# Patient Record
Sex: FEMALE | Race: WHITE | NOT HISPANIC OR LATINO
[De-identification: names, ages, dates, MRNs, and addresses within clinical notes are randomized per-mention and may not be internally consistent; named-entity substitution may affect disease eponyms.]

---

## 2022-01-12 ENCOUNTER — RX ONLY (OUTPATIENT)
Age: 52
Setting detail: RX ONLY
End: 2022-01-12

## 2023-01-12 ENCOUNTER — APPOINTMENT (RX ONLY)
Dept: URBAN - METROPOLITAN AREA CLINIC 146 | Facility: CLINIC | Age: 53
Setting detail: DERMATOLOGY
End: 2023-01-12

## 2023-01-12 DIAGNOSIS — Z41.9 ENCOUNTER FOR PROCEDURE FOR PURPOSES OTHER THAN REMEDYING HEALTH STATE, UNSPECIFIED: ICD-10-CM

## 2023-01-12 PROCEDURE — ? FILLERS

## 2023-01-12 NOTE — PROCEDURE: FILLERS
Marionette Lines Filler  Volume In Cc: 0
Use Map Statement For Sites (Optional): No
Post-Care Instructions: Patient instructed to apply ice to reduce swelling.
Map Statment: See [address] for Complete Details
Number Of Syringes (Required For Inventory): 1
Additional Anesthesia Volume In Cc: 6
Topical Anesthesia?: BLT cream (benzocaine 30%, lidocaine 12%, tetracaine 6%)
Inventory Information: This plan will send filler information to inventory based on the fillers you select. Multiple fillers can be sent but you must ensure you select the appropriate fillers in the inventory tab.
Detail Level: Simple
Show Inventory Tab: Show
Filler: Juvederm Ultra XC
Consent: Written consent obtained.

## 2023-09-20 ENCOUNTER — APPOINTMENT (RX ONLY)
Dept: URBAN - METROPOLITAN AREA CLINIC 146 | Facility: CLINIC | Age: 53
Setting detail: DERMATOLOGY
End: 2023-09-20

## 2023-09-20 DIAGNOSIS — L81.4 OTHER MELANIN HYPERPIGMENTATION: ICD-10-CM

## 2023-09-20 DIAGNOSIS — L57.0 ACTINIC KERATOSIS: ICD-10-CM

## 2023-09-20 DIAGNOSIS — Z12.83 ENCOUNTER FOR SCREENING FOR MALIGNANT NEOPLASM OF SKIN: ICD-10-CM

## 2023-09-20 DIAGNOSIS — D18.0 HEMANGIOMA: ICD-10-CM

## 2023-09-20 DIAGNOSIS — L21.8 OTHER SEBORRHEIC DERMATITIS: ICD-10-CM

## 2023-09-20 DIAGNOSIS — L91.8 OTHER HYPERTROPHIC DISORDERS OF THE SKIN: ICD-10-CM

## 2023-09-20 DIAGNOSIS — L82.1 OTHER SEBORRHEIC KERATOSIS: ICD-10-CM

## 2023-09-20 PROBLEM — D18.01 HEMANGIOMA OF SKIN AND SUBCUTANEOUS TISSUE: Status: ACTIVE | Noted: 2023-09-20

## 2023-09-20 PROBLEM — D23.72 OTHER BENIGN NEOPLASM OF SKIN OF LEFT LOWER LIMB, INCLUDING HIP: Status: ACTIVE | Noted: 2023-09-20

## 2023-09-20 PROCEDURE — ? RECOMMENDATIONS

## 2023-09-20 PROCEDURE — ? COUNSELING

## 2023-09-20 PROCEDURE — ? LIQUID NITROGEN

## 2023-09-20 PROCEDURE — 17110 DESTRUCTION B9 LES UP TO 14: CPT

## 2023-09-20 PROCEDURE — 99213 OFFICE O/P EST LOW 20 MIN: CPT | Mod: 25

## 2023-09-20 PROCEDURE — 17000 DESTRUCT PREMALG LESION: CPT | Mod: 59

## 2023-09-20 ASSESSMENT — LOCATION SIMPLE DESCRIPTION DERM
LOCATION SIMPLE: RIGHT FOREARM
LOCATION SIMPLE: RIGHT PRETIBIAL REGION
LOCATION SIMPLE: UPPER BACK
LOCATION SIMPLE: LEFT LIP
LOCATION SIMPLE: LEFT FOREARM
LOCATION SIMPLE: ABDOMEN
LOCATION SIMPLE: CHEST
LOCATION SIMPLE: LEFT UPPER BACK
LOCATION SIMPLE: RIGHT CHEEK
LOCATION SIMPLE: LEFT PRETIBIAL REGION
LOCATION SIMPLE: RIGHT UPPER BACK
LOCATION SIMPLE: RIGHT SHOULDER
LOCATION SIMPLE: LEFT AXILLARY VAULT
LOCATION SIMPLE: LEFT SHOULDER
LOCATION SIMPLE: LEFT EAR
LOCATION SIMPLE: LEFT CHEEK

## 2023-09-20 ASSESSMENT — LOCATION DETAILED DESCRIPTION DERM
LOCATION DETAILED: RIGHT PROXIMAL PRETIBIAL REGION
LOCATION DETAILED: LEFT CENTRAL MALAR CHEEK
LOCATION DETAILED: LEFT ANTERIOR SHOULDER
LOCATION DETAILED: RIGHT ANTERIOR SHOULDER
LOCATION DETAILED: RIGHT PROXIMAL DORSAL FOREARM
LOCATION DETAILED: LEFT PROXIMAL PRETIBIAL REGION
LOCATION DETAILED: RIGHT INFERIOR CENTRAL MALAR CHEEK
LOCATION DETAILED: LEFT AXILLARY VAULT
LOCATION DETAILED: LEFT UPPER CUTANEOUS LIP
LOCATION DETAILED: RIGHT INFERIOR MEDIAL UPPER BACK
LOCATION DETAILED: RIGHT MEDIAL SUPERIOR CHEST
LOCATION DETAILED: LEFT INFERIOR MEDIAL UPPER BACK
LOCATION DETAILED: UPPER STERNUM
LOCATION DETAILED: SUBXIPHOID
LOCATION DETAILED: LEFT PROXIMAL DORSAL FOREARM
LOCATION DETAILED: SUPERIOR THORACIC SPINE
LOCATION DETAILED: LEFT MEDIAL SUPERIOR CHEST
LOCATION DETAILED: EPIGASTRIC SKIN
LOCATION DETAILED: LEFT SUPERIOR UPPER BACK
LOCATION DETAILED: RIGHT SUPERIOR UPPER BACK
LOCATION DETAILED: LEFT INFERIOR CRUS OF ANTIHELIX

## 2023-09-20 ASSESSMENT — LOCATION ZONE DERM
LOCATION ZONE: AXILLAE
LOCATION ZONE: LEG
LOCATION ZONE: TRUNK
LOCATION ZONE: LIP
LOCATION ZONE: EAR
LOCATION ZONE: FACE
LOCATION ZONE: ARM

## 2023-09-20 NOTE — PROCEDURE: LIQUID NITROGEN
Duration Of Freeze Thaw-Cycle (Seconds): 0
Post-Care Instructions: I reviewed with the patient in detail post-care instructions. Patient is to wear sunprotection. Pt may apply Vaseline to crusted or scabbing areas.
Application Tool (Optional): Liquid Nitrogen Sprayer
Render Note In Bullet Format When Appropriate: No
Show Applicator Variable?: Yes
Detail Level: Detailed
Consent: The patient's consent was obtained.
Medical Necessity Information: It is in your best interest to select a reason for this procedure from the list below. All of these items fulfill various CMS LCD requirements except the new and changing color options.
Medical Necessity Clause: This procedure was medically necessary because the lesions that were treated were:
Spray Paint Text: The liquid nitrogen was applied to the skin utilizing a spray paint frosting technique.

## 2023-09-20 NOTE — PROCEDURE: COUNSELING
Sunscreen Recommendations: Sunblock with zinc encouraged.
Detail Level: Zone
Detail Level: Detailed
Patient Specific Counseling (Will Not Stick From Patient To Patient): Recommend 1% Hydrocortisone cream to AA BID x 2 weeks prn

## 2024-04-01 ENCOUNTER — RX ONLY (OUTPATIENT)
Age: 54
Setting detail: RX ONLY
End: 2024-04-01

## 2024-06-19 ENCOUNTER — APPOINTMENT (RX ONLY)
Dept: URBAN - METROPOLITAN AREA CLINIC 146 | Facility: CLINIC | Age: 54
Setting detail: DERMATOLOGY
End: 2024-06-19

## 2024-06-19 DIAGNOSIS — Z41.9 ENCOUNTER FOR PROCEDURE FOR PURPOSES OTHER THAN REMEDYING HEALTH STATE, UNSPECIFIED: ICD-10-CM

## 2024-06-19 PROCEDURE — ? FILLERS

## 2024-06-19 ASSESSMENT — LOCATION DETAILED DESCRIPTION DERM
LOCATION DETAILED: LEFT MEDIAL BUCCAL CHEEK
LOCATION DETAILED: LEFT UPPER CUTANEOUS LIP
LOCATION DETAILED: RIGHT MEDIAL BUCCAL CHEEK
LOCATION DETAILED: RIGHT INFERIOR MEDIAL MALAR CHEEK

## 2024-06-19 ASSESSMENT — LOCATION SIMPLE DESCRIPTION DERM
LOCATION SIMPLE: RIGHT CHEEK
LOCATION SIMPLE: LEFT LIP
LOCATION SIMPLE: LEFT CHEEK

## 2024-06-19 ASSESSMENT — LOCATION ZONE DERM
LOCATION ZONE: FACE
LOCATION ZONE: LIP

## 2024-06-19 NOTE — PROCEDURE: FILLERS
Additional Area 4 Volume In Cc: 0
Number Of Syringes (Required For Inventory): 1
Include Cannula Information In Note?: No
Topical Anesthesia?: 12% lidocaine, 12% tetracaine
Inventory Information: This plan will send filler information to inventory based on the fillers you select. Multiple fillers can be sent but you must ensure you select the appropriate fillers in the inventory tab.
Detail Level: Simple
Show Inventory Tab: Show
Filler: Juvederm Ultra XC
Consent: Written consent obtained.
Post-Care Instructions: Patient instructed to apply ice to reduce swelling.
Map Statment: See Attach Map for Complete Details

## 2024-09-24 ENCOUNTER — APPOINTMENT (RX ONLY)
Dept: URBAN - METROPOLITAN AREA CLINIC 146 | Facility: CLINIC | Age: 54
Setting detail: DERMATOLOGY
End: 2024-09-24

## 2024-09-24 DIAGNOSIS — D18.0 HEMANGIOMA: ICD-10-CM

## 2024-09-24 DIAGNOSIS — Z12.83 ENCOUNTER FOR SCREENING FOR MALIGNANT NEOPLASM OF SKIN: ICD-10-CM

## 2024-09-24 DIAGNOSIS — L82.0 INFLAMED SEBORRHEIC KERATOSIS: ICD-10-CM

## 2024-09-24 DIAGNOSIS — L84 CORNS AND CALLOSITIES: ICD-10-CM

## 2024-09-24 DIAGNOSIS — L81.4 OTHER MELANIN HYPERPIGMENTATION: ICD-10-CM

## 2024-09-24 DIAGNOSIS — L82.1 OTHER SEBORRHEIC KERATOSIS: ICD-10-CM

## 2024-09-24 DIAGNOSIS — L57.0 ACTINIC KERATOSIS: ICD-10-CM

## 2024-09-24 PROBLEM — D18.01 HEMANGIOMA OF SKIN AND SUBCUTANEOUS TISSUE: Status: ACTIVE | Noted: 2024-09-24

## 2024-09-24 PROCEDURE — ? COUNSELING

## 2024-09-24 PROCEDURE — 99213 OFFICE O/P EST LOW 20 MIN: CPT | Mod: 25

## 2024-09-24 PROCEDURE — 17110 DESTRUCTION B9 LES UP TO 14: CPT

## 2024-09-24 PROCEDURE — ? PRESCRIPTION

## 2024-09-24 PROCEDURE — ? PRESCRIPTION MEDICATION MANAGEMENT

## 2024-09-24 PROCEDURE — ? LIQUID NITROGEN

## 2024-09-24 RX ORDER — FLUOROURACIL 5 MG/G
CREAM TOPICAL QHS
Qty: 40 | Refills: 2 | Status: ERX | COMMUNITY
Start: 2024-09-24

## 2024-09-24 RX ADMIN — FLUOROURACIL: 5 CREAM TOPICAL at 00:00

## 2024-09-24 ASSESSMENT — LOCATION DETAILED DESCRIPTION DERM
LOCATION DETAILED: RIGHT DISTAL POSTERIOR THIGH
LOCATION DETAILED: LEFT POSTERIOR SHOULDER
LOCATION DETAILED: PERIUMBILICAL SKIN
LOCATION DETAILED: RIGHT POSTERIOR SHOULDER
LOCATION DETAILED: RIGHT MEDIAL PLANTAR 1ST TOE
LOCATION DETAILED: LEFT MEDIAL PLANTAR HEEL
LOCATION DETAILED: LEFT PROXIMAL CALF
LOCATION DETAILED: INFERIOR THORACIC SPINE
LOCATION DETAILED: MIDDLE STERNUM
LOCATION DETAILED: RIGHT MID-UPPER BACK
LOCATION DETAILED: LEFT ANTERIOR PROXIMAL UPPER ARM
LOCATION DETAILED: LEFT FOREHEAD
LOCATION DETAILED: LEFT SUPERIOR UPPER BACK
LOCATION DETAILED: SUPERIOR MID FOREHEAD
LOCATION DETAILED: RIGHT VENTRAL PROXIMAL FOREARM
LOCATION DETAILED: RIGHT ANTERIOR PROXIMAL THIGH
LOCATION DETAILED: RIGHT CENTRAL MALAR CHEEK
LOCATION DETAILED: RIGHT MEDIAL SUPERIOR CHEST
LOCATION DETAILED: RIGHT DISTAL POSTERIOR UPPER ARM
LOCATION DETAILED: LEFT ANTERIOR PROXIMAL THIGH
LOCATION DETAILED: RIGHT LATERAL FOREHEAD
LOCATION DETAILED: LEFT MEDIAL PLANTAR 1ST TOE
LOCATION DETAILED: RIGHT MEDIAL PLANTAR HEEL
LOCATION DETAILED: LEFT MEDIAL SUPERIOR CHEST
LOCATION DETAILED: LEFT DISTAL POSTERIOR UPPER ARM
LOCATION DETAILED: EPIGASTRIC SKIN

## 2024-09-24 ASSESSMENT — LOCATION SIMPLE DESCRIPTION DERM
LOCATION SIMPLE: PLANTAR SURFACE OF RIGHT 1ST TOE
LOCATION SIMPLE: LEFT UPPER ARM
LOCATION SIMPLE: RIGHT FOREHEAD
LOCATION SIMPLE: LEFT PLANTAR SURFACE
LOCATION SIMPLE: LEFT SHOULDER
LOCATION SIMPLE: RIGHT POSTERIOR THIGH
LOCATION SIMPLE: RIGHT FOREARM
LOCATION SIMPLE: PLANTAR SURFACE OF LEFT 1ST TOE
LOCATION SIMPLE: UPPER BACK
LOCATION SIMPLE: CHEST
LOCATION SIMPLE: RIGHT UPPER BACK
LOCATION SIMPLE: LEFT THIGH
LOCATION SIMPLE: RIGHT UPPER ARM
LOCATION SIMPLE: ABDOMEN
LOCATION SIMPLE: SUPERIOR FOREHEAD
LOCATION SIMPLE: RIGHT PLANTAR SURFACE
LOCATION SIMPLE: LEFT CALF
LOCATION SIMPLE: RIGHT THIGH
LOCATION SIMPLE: LEFT UPPER BACK
LOCATION SIMPLE: RIGHT CHEEK
LOCATION SIMPLE: RIGHT SHOULDER
LOCATION SIMPLE: LEFT FOREHEAD

## 2024-09-24 ASSESSMENT — LOCATION ZONE DERM
LOCATION ZONE: TRUNK
LOCATION ZONE: TOE
LOCATION ZONE: FACE
LOCATION ZONE: FEET
LOCATION ZONE: LEG
LOCATION ZONE: ARM

## 2024-09-24 NOTE — PROCEDURE: PRESCRIPTION MEDICATION MANAGEMENT
Render In Strict Bullet Format?: No
Initiate Treatment: fluorouracil 5 % topical cream QHS\\nQuantity: 40.0 g  Days Supply: 30\\nSig: Apply thin layer to chest and forehead QHS x 2weeks as tolerated.
Detail Level: Zone

## 2024-09-24 NOTE — PROCEDURE: LIQUID NITROGEN
Add 52 Modifier (Optional): no
Medical Necessity Clause: This procedure was medically necessary because the lesions that were treated were:
Spray Paint Text: The liquid nitrogen was applied to the skin utilizing a spray paint frosting technique.
Show Applicator Variable?: Yes
Medical Necessity Information: It is in your best interest to select a reason for this procedure from the list below. All of these items fulfill various CMS LCD requirements except the new and changing color options.
Post-Care Instructions: I reviewed with the patient in detail post-care instructions. Patient is to wear sunprotection, and avoid picking at any of the treated lesions. Pt may apply Vaseline to crusted or scabbing areas.
Consent: The patient's consent was obtained including but not limited to risks of crusting, scabbing, blistering, scarring, darker or lighter pigmentary change, recurrence, incomplete removal and infection.
Detail Level: Detailed

## 2024-09-24 NOTE — PROCEDURE: COUNSELING
Detail Level: Simple
Sunscreen Recommendations: Blue Lizard
Skin Checks Recommendations: at least annually
Detail Level: Generalized
Detail Level: Detailed